# Patient Record
(demographics unavailable — no encounter records)

---

## 2024-12-23 NOTE — PHYSICAL EXAM
[de-identified] : Examination of the [right] shoulder reveals equal active and passive motion as compared to the contralateral side There is a positive Speed, positive Ashley, positive Amador, tenderness with anterior shoulder compression. 3+/5 strength [de-identified] : [4] views of [right] shoulder were obtained today in my office and were seen by me and discussed with the patient.  These [show findings consistent with AC arthropathy and grossly preserved glenohumeral joint space] - no obvious fx

## 2024-12-23 NOTE — HISTORY OF PRESENT ILLNESS
[FreeTextEntry1] : Veronique is a very pleasant 68-year-old female presenting today with a acute on chronic history of right shoulder discomfort.  She is did suffer a fall on 12-4 which worsened her condition.  She is having difficulty sleeping difficulty with overhead motion.

## 2024-12-23 NOTE — ASSESSMENT
[FreeTextEntry1] : ASSESSMENT: The patient comes in today with acute on chronic symptoms of right shoulder discomfort.  We have discussed exacerbation of underlying tendinopathy of the shoulder and significant bursitis.  With this in mind we have discussed treatment options.  The patient would like to proceed with an injection. Defers PT. we have discussed activity modification and home exercise.   The patient was adequately and thoroughly informed of my assessment of their current condition(s).  - This may diminish bodily function for the extremity. We discussed prognosis, tx modalities including operative and nonoperative options for the above diagnostic assessment. As always, 2nd opinion is always provided as an option.  When accessible, I was able to review other physicians note(s) including reviewing other tests, imaging results as well as personally view these results for my own interpretation.   [Right] SUBACROMIAL SHOULDER INJECTION   Indication:  subacromial bursitis/impingement, pain   Risk, benefits and alternatives were discussed with the patient. Potential complications include bleeding and infection. Alcohol was used to prep the area.  Ethyl chloride spray was used as a topical anesthetic.  Using sterile technique, the injection needle was then directed from a standard posterior approach parallel to and inferior to the acromion. A 21g needle was used to inject 5 mL of 1% Lidocaine and 1 unit 10mg Kenalog.  No significant resistance was encountered.  A bandage was applied.  The patient tolerated the procedure well.    Patient instructed to avoid strenuous activity for 2 day(s). Specifically counseled regarding the signs and symptoms of potential infection and instructed to present promptly to clinic or hospital if such signs and symptoms arise.  The patient was adequately and thoroughly informed of my assessment of their current condition(s).  DISCUSSION: 1.  Right shoulder injection.  HEP activity modification.  Follow-up 6 weeks to reassess 2. [x] 3. [x]